# Patient Record
Sex: MALE | Race: WHITE | NOT HISPANIC OR LATINO | URBAN - METROPOLITAN AREA
[De-identification: names, ages, dates, MRNs, and addresses within clinical notes are randomized per-mention and may not be internally consistent; named-entity substitution may affect disease eponyms.]

---

## 2021-03-04 ENCOUNTER — IMPORTED ENCOUNTER (OUTPATIENT)
Dept: URBAN - METROPOLITAN AREA CLINIC 31 | Facility: CLINIC | Age: 76
End: 2021-03-04

## 2021-03-04 PROBLEM — H25.813: Noted: 2021-03-04

## 2021-03-04 PROCEDURE — 92004 COMPRE OPH EXAM NEW PT 1/>: CPT

## 2021-03-04 NOTE — PATIENT DISCUSSION
Discussed the risks benefits alternatives and limitations of cataract surgery including infection bleeding loss of vision retinal tears detachment. The patient stated a full understanding and a desire to proceed with the procedure in both eyes. Refractive options were reviewed. Patient has elected to be optimized for distance vision in both eyes. The patient will still need glasses for reading and to possibly fine tune distance vision. Standard MFIOL EDOF discussed. Schedule KPE/IOL OS/OD if desired. PT will call if he wants to proceed. If chooses upgraded lens then $1950/eye for MFIOL EDOFReturn for an appointment in 12 months for comprehensive exam. if no surgery admit if he schedules with Dr. Shawna Stoner.

## 2021-06-11 ENCOUNTER — IMPORTED ENCOUNTER (OUTPATIENT)
Dept: URBAN - METROPOLITAN AREA CLINIC 31 | Facility: CLINIC | Age: 76
End: 2021-06-11

## 2021-06-11 PROCEDURE — 92136 OPHTHALMIC BIOMETRY: CPT

## 2021-06-11 PROCEDURE — 92134 CPTRZ OPH DX IMG PST SGM RTA: CPT

## 2021-06-11 PROCEDURE — 92286 ANT SGM IMG I&R SPECLR MIC: CPT

## 2021-06-11 PROCEDURE — 92025 CPTRIZED CORNEAL TOPOGRAPHY: CPT

## 2021-06-22 ENCOUNTER — IMPORTED ENCOUNTER (OUTPATIENT)
Dept: URBAN - METROPOLITAN AREA CLINIC 31 | Facility: CLINIC | Age: 76
End: 2021-06-22

## 2021-06-22 PROCEDURE — 99024 POSTOP FOLLOW-UP VISIT: CPT

## 2021-06-22 NOTE — PATIENT DISCUSSION
Post-Op Day #1 - Cataract Surgery Left Eye (OS) - doing well. Tears prn. Continue postop drops as directed.    Call office with symptoms of pain redness or decreased vision in operative eye.  1 drop of tobramycin instilledPatient is to return in 1 week for PO/MRX with Dr Colette [place]

## 2021-08-19 ENCOUNTER — IMPORTED ENCOUNTER (OUTPATIENT)
Dept: URBAN - METROPOLITAN AREA CLINIC 31 | Facility: CLINIC | Age: 76
End: 2021-08-19

## 2021-08-19 PROBLEM — Z96.1: Noted: 2021-08-19

## 2021-08-19 PROCEDURE — 99024 POSTOP FOLLOW-UP VISIT: CPT

## 2021-08-19 NOTE — PATIENT DISCUSSION
Post-Op Cataract Surgery 15-90 days Both Eyes (OU)-  Doing well with stable vision s/p vivity ou. Monitor for changesReturn for an appointment in 4 months for dilated fundus exam. with Dr. Elida Thomas.

## 2021-11-03 PROBLEM — Z96.1: Noted: 2021-11-03

## 2022-01-13 ENCOUNTER — IMPORTED ENCOUNTER (OUTPATIENT)
Dept: URBAN - METROPOLITAN AREA CLINIC 31 | Facility: CLINIC | Age: 77
End: 2022-01-13

## 2022-01-13 PROBLEM — Z96.1: Noted: 2022-01-13

## 2022-01-13 PROCEDURE — 99214 OFFICE O/P EST MOD 30 MIN: CPT

## 2022-01-13 NOTE — PATIENT DISCUSSION
1.  Pseudophakia OU - IOLs stable. Monitor for changes in vision. minimal striae in capsule should improve over time2. Return for an appointment in 1 year for comprehensive exam. with Dr. Gricel Benavidez.

## 2022-04-02 ASSESSMENT — TONOMETRY
OD_IOP_MMHG: 12
OD_IOP_MMHG: 14
OS_IOP_MMHG: 12
OS_IOP_MMHG: 13
OS_IOP_MMHG: 15
OD_IOP_MMHG: 12

## 2022-04-02 ASSESSMENT — VISUAL ACUITY
OD_SC: J317''
OS_CC: 20/20
OS_CC: 20/20-2
OS_CC: 20/40
OD_SC: 20/10016''
OD_CC: 20/40-2
OD_GLARE: 20/40
OD_GLARE: 20/70
OU_SC: J317''
OD_CC: 20/20
OD_CC: 20/20-3
OS_SC: J317''
OS_SC: 20/200
OS_GLARE: 20/50
OS_GLARE: 20/70
OS_SC: 20/5016''
OD_SC: 20/200

## 2023-06-09 ENCOUNTER — PREPPED CHART (OUTPATIENT)
Dept: URBAN - METROPOLITAN AREA CLINIC 34 | Facility: CLINIC | Age: 78
End: 2023-06-09

## 2023-06-21 ENCOUNTER — COMPREHENSIVE EXAM (OUTPATIENT)
Dept: URBAN - METROPOLITAN AREA CLINIC 34 | Facility: CLINIC | Age: 78
End: 2023-06-21

## 2023-06-21 DIAGNOSIS — Z96.1: ICD-10-CM

## 2023-06-21 PROCEDURE — 92014 COMPRE OPH EXAM EST PT 1/>: CPT

## 2023-06-21 PROCEDURE — 92015 DETERMINE REFRACTIVE STATE: CPT

## 2023-06-21 ASSESSMENT — VISUAL ACUITY
OS_SC: J3
OS_BAT: 20/40
OD_SC: J3
OD_BAT: 20/40
OD_SC: 20/20
OS_SC: 20/20

## 2023-06-21 ASSESSMENT — TONOMETRY
OD_IOP_MMHG: 13
OS_IOP_MMHG: 13

## 2024-06-25 ENCOUNTER — COMPREHENSIVE EXAM (OUTPATIENT)
Dept: URBAN - METROPOLITAN AREA CLINIC 34 | Facility: CLINIC | Age: 79
End: 2024-06-25

## 2024-06-25 DIAGNOSIS — Z96.1: ICD-10-CM

## 2024-06-25 DIAGNOSIS — H43.393: ICD-10-CM

## 2024-06-25 PROCEDURE — 92015 DETERMINE REFRACTIVE STATE: CPT

## 2024-06-25 PROCEDURE — 92014 COMPRE OPH EXAM EST PT 1/>: CPT

## 2024-06-25 ASSESSMENT — VISUAL ACUITY
OD_SC: 20/30-3
OU_SC: J2
OS_SC: 20/30

## 2024-06-25 ASSESSMENT — TONOMETRY
OD_IOP_MMHG: 14
OS_IOP_MMHG: 13

## 2025-08-18 ENCOUNTER — COMPREHENSIVE EXAM (OUTPATIENT)
Age: 80
End: 2025-08-18

## 2025-08-18 DIAGNOSIS — H43.393: ICD-10-CM

## 2025-08-18 DIAGNOSIS — Z96.1: ICD-10-CM

## 2025-08-18 PROCEDURE — 92014 COMPRE OPH EXAM EST PT 1/>: CPT

## 2025-08-18 PROCEDURE — 92015 DETERMINE REFRACTIVE STATE: CPT
